# Patient Record
Sex: FEMALE | Race: WHITE
[De-identification: names, ages, dates, MRNs, and addresses within clinical notes are randomized per-mention and may not be internally consistent; named-entity substitution may affect disease eponyms.]

---

## 2018-03-15 ENCOUNTER — HOSPITAL ENCOUNTER (OUTPATIENT)
Dept: HOSPITAL 76 - DI.S | Age: 58
Discharge: HOME | End: 2018-03-15
Attending: INTERNAL MEDICINE
Payer: COMMERCIAL

## 2018-03-15 DIAGNOSIS — M47.892: Primary | ICD-10-CM

## 2018-03-15 PROCEDURE — 72050 X-RAY EXAM NECK SPINE 4/5VWS: CPT

## 2018-03-16 NOTE — XRAY REPORT
CERVICAL SPINE: 03/15/2018

 

COMPARISON:  No comparison.

 

INDICATION: Cervicalgia.

 

TECHNIQUE:  Five views.

 

FINDINGS

There is mild disc space narrowing at C5-C6.  There is moderate disk space 
narrowing at C6-C7 

and C7-T1.  There are small to moderate anterior osteophytes.

 

There are moderate degenerative changes of the facets.

 

Osteophytes cause narrowing of the bony neural foramina as follows:

 

Right:



C30C4 moderate, C4-C5 Moderate, C5-C6 Moderate-severe, C6-C7 minimal



Left:



C3-C4 mild, C4-C5 mild

 

No evidence of acute fracture.  Prevertebral soft tissues appear unremarkable.  
The lateral 

masses are symmetric.

 

IMPRESSION:  MODERATE CERVICAL SPONDYLOSIS AS DETAILED ABOVE.

 

 

DD: 03/15/2018 12:03

TD: 03/15/2018 12:19

Job #: [id number]

NICOLE